# Patient Record
Sex: FEMALE | Race: WHITE | NOT HISPANIC OR LATINO | Employment: OTHER | URBAN - METROPOLITAN AREA
[De-identification: names, ages, dates, MRNs, and addresses within clinical notes are randomized per-mention and may not be internally consistent; named-entity substitution may affect disease eponyms.]

---

## 2021-02-15 ENCOUNTER — IMMUNIZATION (OUTPATIENT)
Dept: VACCINE CLINIC | Facility: HOSPITAL | Age: 76
End: 2021-02-15

## 2021-02-15 PROCEDURE — 91300 HC SARSCOV02 VAC 30MCG/0.3ML IM: CPT | Performed by: INTERNAL MEDICINE

## 2021-02-15 PROCEDURE — 0001A: CPT | Performed by: INTERNAL MEDICINE

## 2021-03-08 ENCOUNTER — IMMUNIZATION (OUTPATIENT)
Dept: VACCINE CLINIC | Facility: HOSPITAL | Age: 76
End: 2021-03-08

## 2021-03-08 PROCEDURE — 91300 HC SARSCOV02 VAC 30MCG/0.3ML IM: CPT | Performed by: INTERNAL MEDICINE

## 2021-03-08 PROCEDURE — 0002A: CPT | Performed by: INTERNAL MEDICINE

## 2023-10-09 NOTE — PROGRESS NOTES
[facility] at [facility]  Cardiology Consultation Note     Fatuma Guardado  1945  Requesting Provider: Self Referring  PCP: Radha Schmitz MD    ID:  Fatuma Guardado is a 78 y.o. female who resides in [place], [place].     REASON FOR CONSULTATION:    Hypertension         Dear Dr. Schmitz:    Roro Guardado referred herself to my office today for evaluation of hypertension and for possible preoperative cardiac risk assessment.  She previously saw Dr. Hill who passed away and more recently Dr. Mendoza who relocated.  Patient has been seen in cardiology clinic for hypertension and preventative measures.  Her blood pressure has been challenging to control, primarily due to her multiple drug intolerances      Past Medical History, Past Surgical History, Family history, Social History, and Medications were all reviewed with the patient today and updated as necessary.       Current Outpatient Medications:     azelastine (ASTEPRO) 0.15 % solution nasal spray, into the nostril(s) as directed by provider 2 (Two) Times a Day., Disp: , Rfl:     ezetimibe (ZETIA) 10 MG tablet, Take 1 tablet by mouth Daily., Disp: , Rfl:     pravastatin (PRAVACHOL) 20 MG tablet, Take 1 tablet by mouth Every Night., Disp: , Rfl:     Allergies   Allergen Reactions    Lisinopril Cough    Losartan Cough    Beta Adrenergic Blockers Other (See Comments)     Raynauds    Amoxicillin-Pot Clavulanate Diarrhea    Penicillins Unknown (See Comments)    Sulfa Antibiotics Unknown (See Comments)         Past Medical History:   Diagnosis Date    Abnormal ECG     Arrhythmia     Coronary artery disease     Hypertension     Raynaud's syndrome        Past Surgical History:   Procedure Laterality Date    TONSILLECTOMY         Family History   Problem Relation Age of Onset    Hypertension Mother     Stroke Mother     No Known Problems Father     Raynaud syndrome Maternal Aunt     Heart attack Maternal Uncle     Stroke Maternal Uncle   "   Stroke Maternal Grandmother     Hypertension Maternal Grandmother        Social History     Tobacco Use    Smoking status: Never    Smokeless tobacco: Never   Substance Use Topics    Alcohol use: Never       Review of Systems   Cardiovascular:  Positive for irregular heartbeat and leg swelling.   All other systems reviewed and are negative.              /66 (BP Location: Left arm, Patient Position: Sitting, Cuff Size: Adult)   Pulse 90   Ht 165.1 cm (65\") Comment: pt reported height  Wt 57.6 kg (127 lb)   SpO2 98%   BMI 21.13 kg/mý        Constitutional:       Appearance: Healthy appearance. Well-developed.   Eyes:      General: Lids are normal. No scleral icterus.     Conjunctiva/sclera: Conjunctivae normal.   HENT:      Head: Normocephalic and atraumatic.   Neck:      Thyroid: No thyromegaly.      Vascular: No carotid bruit or JVD.   Pulmonary:      Effort: Pulmonary effort is normal.      Breath sounds: Normal breath sounds. No wheezing. No rhonchi. No rales.   Cardiovascular:      Normal rate. Regular rhythm.      Murmurs: There is no murmur.      No gallop.  No rub.   Pulses:     Intact distal pulses.   Edema:     Peripheral edema absent.   Abdominal:      General: There is no distension.      Palpations: Abdomen is soft. There is no abdominal mass.   Musculoskeletal:      Cervical back: Normal range of motion. Skin:     General: Skin is warm and dry.      Findings: No rash.   Neurological:      General: No focal deficit present.      Mental Status: Alert and oriented to person, place, and time.      Gait: Gait is intact.   Psychiatric:         Attention and Perception: Attention normal.         Mood and Affect: Mood normal.         Behavior: Behavior normal.             ECG 12 Lead    Date/Time: 10/10/2023 5:40 PM  Performed by: Minh Mancia IV, MD    Authorized by: Minh Mancia IV, MD  Comparison: not compared with previous ECG   Rhythm: sinus rhythm  Ectopy: unifocal " PVCs  BPM: 90    Clinical impression: abnormal EKG  Comments: Frequent PVCs          Labs (8/10/2023):  TSH 2.8  Labs (2/9/2023):  Glucose 87, creat 0.82, BUN 23, Na 138, K 4.8, AST 20, ALT 18  Labs (8/25/2022):  Chol 138, Trig 55, HDL 58, LDL 69         Diagnoses and all orders for this visit:    1. Essential hypertension (Primary)  Overview:  Target blood pressure <130/80 mmHg  Echo (1/6/2021): LVEF >55%.  No valvular abnormality.  Intolerant to ACE/ARB (productive cough), beta blockers (Raynaud's), calcium channel blockers (constipation and swelling)      Assessment & Plan:  Labile blood pressures with both hypotension and hypertension recorded on home blood pressure log  Continue diltiazem 30 mg twice daily for now  Drug intolerances limit options.  May consider hydrochlorothiazide 12.5 mg daily as alternative to short acting diltiazem        2. Coronary artery disease involving native coronary artery of native heart with angina pectoris  Overview:   (91st percentile for age),   Multivessel coronary artery calcification on chest CT, 10/4/2023    Assessment & Plan:  No angina, but frequent PVCs on EKG today  Multivessel coronary calcification noted on CT chest  Risk stratify with pharmacologic nuclear stress test prior to undergoing surgery    Orders:  -     Stress Test With Myocardial Perfusion (1 Day); Future  -     Adult Transthoracic Echo Complete W/ Cont if Necessary Per Protocol; Future    3. Hyperlipidemia LDL goal <70  Overview:  Statin therapy indicated due to the presence of CAD    Assessment & Plan:  With extensive coronary plaque volume noted on coronary calcium score testing in 2019, high intensity statin therapy has been indicated  Presently patient on low-dose pravastatin, which does not comport with ACC guidelines  Would consider switching pravastatin to atorvastatin 40 mg or rosuvastatin 20 mg daily      4. Premature ventricular contractions (PVCs) (VPCs)  Overview:  Holter (2020): PVCs  2.1%, PACs 6.4%.  No associated symptoms.  Echo (1/6/2021): LVEF >55%.  No significant valve abnormality    Assessment & Plan:  Asymptomatic but frequent PVCs on EKG today  Obtain echo  Obtain pharmacologic nuclear stress    Orders:  -     Adult Transthoracic Echo Complete W/ Cont if Necessary Per Protocol; Future    5. Preoperative cardiovascular examination  Overview:  EKG (10/10/2023): Sinus rhythm with frequent PVCs    Assessment & Plan:  No angina or heart failure symptoms  Frequent PVCs on EKG and multivessel coronary calcium noted on recent CT chest  Recommend risk stratification with echo and nuclear stress test prior to surgery if feasible      Other orders  -     ECG 12 Lead                 Echo  Pharmacologic nuclear stress test  Consider switching pravastatin to atorvastatin 40 or rosuvastatin 20 due to the presence of CAD  If above studies unremarkable, may proceed with surgery with acceptable cardiovascular risk  For now, continue diltiazem immediate release for blood pressure  Return in about 3 months (around 1/10/2024).        BRODERICK Mancia MD, FACC, [facility]  Interventional Cardiology  10/10/23  17:45 EDT

## 2023-10-10 ENCOUNTER — OFFICE VISIT (OUTPATIENT)
Dept: CARDIOLOGY | Facility: CLINIC | Age: 78
End: 2023-10-10
Payer: MEDICARE

## 2023-10-10 VITALS
DIASTOLIC BLOOD PRESSURE: 66 MMHG | HEART RATE: 90 BPM | OXYGEN SATURATION: 98 % | HEIGHT: 65 IN | WEIGHT: 127 LBS | SYSTOLIC BLOOD PRESSURE: 124 MMHG | BODY MASS INDEX: 21.16 KG/M2

## 2023-10-10 DIAGNOSIS — Z01.810 PREOPERATIVE CARDIOVASCULAR EXAMINATION: ICD-10-CM

## 2023-10-10 DIAGNOSIS — I49.3 PREMATURE VENTRICULAR CONTRACTIONS (PVCS) (VPCS): ICD-10-CM

## 2023-10-10 DIAGNOSIS — I10 ESSENTIAL HYPERTENSION: Primary | ICD-10-CM

## 2023-10-10 DIAGNOSIS — E78.5 HYPERLIPIDEMIA LDL GOAL <70: ICD-10-CM

## 2023-10-10 DIAGNOSIS — I25.119 CORONARY ARTERY DISEASE INVOLVING NATIVE CORONARY ARTERY OF NATIVE HEART WITH ANGINA PECTORIS: ICD-10-CM

## 2023-10-10 PROCEDURE — 3074F SYST BP LT 130 MM HG: CPT | Performed by: INTERNAL MEDICINE

## 2023-10-10 PROCEDURE — 1160F RVW MEDS BY RX/DR IN RCRD: CPT | Performed by: INTERNAL MEDICINE

## 2023-10-10 PROCEDURE — 93000 ELECTROCARDIOGRAM COMPLETE: CPT | Performed by: INTERNAL MEDICINE

## 2023-10-10 PROCEDURE — 99204 OFFICE O/P NEW MOD 45 MIN: CPT | Performed by: INTERNAL MEDICINE

## 2023-10-10 PROCEDURE — 3078F DIAST BP <80 MM HG: CPT | Performed by: INTERNAL MEDICINE

## 2023-10-10 PROCEDURE — 1159F MED LIST DOCD IN RCRD: CPT | Performed by: INTERNAL MEDICINE

## 2023-10-10 RX ORDER — AZELASTINE HCL 205.5 UG/1
SPRAY NASAL 2 TIMES DAILY
COMMUNITY

## 2023-10-10 RX ORDER — EZETIMIBE 10 MG/1
10 TABLET ORAL DAILY
COMMUNITY
Start: 2019-05-01 | End: 2024-08-09

## 2023-10-10 RX ORDER — PRAVASTATIN SODIUM 20 MG
20 TABLET ORAL NIGHTLY
COMMUNITY
Start: 2023-08-10 | End: 2024-08-27

## 2023-10-10 NOTE — ASSESSMENT & PLAN NOTE
No angina or heart failure symptoms  Frequent PVCs on EKG and multivessel coronary calcium noted on recent CT chest  Recommend risk stratification with echo and nuclear stress test prior to surgery if feasible

## 2023-10-10 NOTE — ASSESSMENT & PLAN NOTE
No angina, but frequent PVCs on EKG today  Multivessel coronary calcification noted on CT chest  Risk stratify with pharmacologic nuclear stress test prior to undergoing surgery

## 2023-10-10 NOTE — ASSESSMENT & PLAN NOTE
Labile blood pressures with both hypotension and hypertension recorded on home blood pressure log  Continue diltiazem 30 mg twice daily for now  Drug intolerances limit options.  May consider hydrochlorothiazide 12.5 mg daily as alternative to short acting diltiazem

## 2023-10-10 NOTE — ASSESSMENT & PLAN NOTE
With extensive coronary plaque volume noted on coronary calcium score testing in 2019, high intensity statin therapy has been indicated  Presently patient on low-dose pravastatin, which does not comport with ACC guidelines  Would consider switching pravastatin to atorvastatin 40 mg or rosuvastatin 20 mg daily

## 2023-10-17 ENCOUNTER — TELEPHONE (OUTPATIENT)
Dept: CARDIOLOGY | Facility: CLINIC | Age: 78
End: 2023-10-17

## 2023-10-17 RX ORDER — DILTIAZEM HYDROCHLORIDE 120 MG/1
120 CAPSULE, COATED, EXTENDED RELEASE ORAL DAILY
COMMUNITY

## 2023-10-17 RX ORDER — GLUCOSAMINE HCL 500 MG
200 TABLET ORAL DAILY
COMMUNITY

## 2023-10-17 RX ORDER — ACETAMINOPHEN 160 MG
2000 TABLET,DISINTEGRATING ORAL DAILY
COMMUNITY

## 2023-10-17 NOTE — TELEPHONE ENCOUNTER
Caller: Fatuma Guardado    Relationship: Self    Best call back number: [phone] (home)       What is the best time to reach you: ANY    Who are you requesting to speak with (clinical staff, provider,  specific staff member): ANY    What was the call regarding: PATIENT STATES THAT HER CURRENT MEDICATIONS IN HER CHART ARE NOT ACCURATE. BELOW IS LISTED THE ACTIVE MEDICATIONS THAT THE PATIENT IS ON. THE ASTERPRO AND THE ZETIA ARE CURRENT MEDICATIONS. PLEASE ADD TO THE PATIENT'S CHART:    DILTIAZEM - ONCE DAILY 120 MG (10-11-23)  PRAVASTATIN - ONCE DAILY 40 MG (INSTEAD OF 20MG) (10-11-23)  COQ-10 - ONCE DAILY 200MG (10-11-23)  VITAMIN D-3 - ONCE DAILY 2000 UT (10-11-23)  DR PENG LOPES IS THE PRESCRIBER FROM THE [facility].    Is it okay if the provider responds through MyChart: PHONE CALL PLEASE

## 2023-10-23 ENCOUNTER — TELEPHONE (OUTPATIENT)
Dept: CARDIOLOGY | Facility: CLINIC | Age: 78
End: 2023-10-23
Payer: MEDICARE

## 2023-10-23 NOTE — TELEPHONE ENCOUNTER
Just ALLEN, you ordered a stress and echo for cardiac clearance on her consult visit 10/10/23. She cancelled this and scheduled herself at . I am not sure who ordered it because our scheduling did not arrange it. She called to report she had the testing today.    Will you review testing in care everywhere to see if she can be cleared for surgery?

## 2023-10-24 NOTE — TELEPHONE ENCOUNTER
Scheduling said it was something about her medications were not reported correctly so she needed a nuclear instead. They rescheduled it but not within the timeframe she needed. Results are in care everywhere.

## 2023-10-24 NOTE — TELEPHONE ENCOUNTER
She is cleared for surgery.    BRODERICK Mancia MD FAC, [facility]  Interventional and General Cardiology